# Patient Record
(demographics unavailable — no encounter records)

---

## 2025-02-12 NOTE — HISTORY OF PRESENT ILLNESS
[FreeTextEntry1] : palp[itatins and left neck pain   HPI for today: 2 12 2025: feels good. no chest pain . no dyspnea on exertion  complaitn with meds.  no dizizness.  no syncope. no chest pain .   she is not takign statins often.  she forgets.     old note: This is a  59 year old woman with history of hypertension  family history of early CAD, presents with new onset palpitations./  palpitations, its going on for few months   No  syncope,. no dizziness.   no assoicated dyspnea on exertion . no chest pain. it feels like the heart is racing.  and last for few mins.  it happens 4 times a day. she thinks it is due to her amlodipine meds.  she started takign 10 mg dose 2 years ago  she does not drink coffee. she only drinks tea.   she may have anxiety.  she does not sleep well.  she is strssed oout. she has tensiopn headaceh.  a lot of tension headacehs. she has neck pain.  intermittent 4 /10 .  not constant. once every 2 weeks.    ,

## 2025-02-12 NOTE — REASON FOR VISIT
[Symptom and Test Evaluation] : symptom and test evaluation [FreeTextEntry1] : palp[itatins and left neck pain

## 2025-02-12 NOTE — DISCUSSION/SUMMARY
[Patient] : the patient [Risks] : risks [Benefits] : benefits [Alternatives] : alternatives [With Me] : with me [___ Year(s)] : in [unfilled] year(s) [FreeTextEntry1] : This is a 59 year old woman with history of hypertension, here with neck pain and palpitaitons  1)  coronary artery disease screening:  CAC =0 2) palpitaitons:  reasssruances.  likely due to anexity. last monitor no significant evenets.  3) hypertension : amlodpioien 10 mg and 5 mg alternating   transthoracic echocardiogram  4) dyslipidemia :  with elevated lipopriotien a levle: LDL goal < 100.  on statins.  complaince addressed.  [EKG obtained to assist in diagnosis and management of assessed problem(s)] : EKG obtained to assist in diagnosis and management of assessed problem(s)